# Patient Record
Sex: FEMALE | Race: BLACK OR AFRICAN AMERICAN | NOT HISPANIC OR LATINO | URBAN - METROPOLITAN AREA
[De-identification: names, ages, dates, MRNs, and addresses within clinical notes are randomized per-mention and may not be internally consistent; named-entity substitution may affect disease eponyms.]

---

## 2021-05-06 ENCOUNTER — OFFICE VISIT (OUTPATIENT)
Dept: URBAN - METROPOLITAN AREA CLINIC 118 | Facility: CLINIC | Age: 31
End: 2021-05-06
Payer: COMMERCIAL

## 2021-05-06 VITALS
TEMPERATURE: 98.4 F | WEIGHT: 175.2 LBS | HEIGHT: 67 IN | BODY MASS INDEX: 27.5 KG/M2 | DIASTOLIC BLOOD PRESSURE: 84 MMHG | HEART RATE: 79 BPM | SYSTOLIC BLOOD PRESSURE: 119 MMHG

## 2021-05-06 DIAGNOSIS — K58.1 IRRITABLE BOWEL SYNDROME WITH CONSTIPATION: ICD-10-CM

## 2021-05-06 PROCEDURE — 99243 OFF/OP CNSLTJ NEW/EST LOW 30: CPT | Performed by: INTERNAL MEDICINE

## 2021-05-06 PROCEDURE — 99213 OFFICE O/P EST LOW 20 MIN: CPT | Performed by: INTERNAL MEDICINE

## 2021-05-06 NOTE — PHYSICAL EXAM GASTROINTESTINAL
Abdomen,  soft, LLQ TTP, nondistended,  no guarding or rigidity,  no masses palpable,  normal bowel sounds,  Liver and Spleen, no hepatosplenomegaly

## 2021-05-08 LAB — T-TRANSGLUTAMINASE (TTG) IGA: <2

## 2021-06-07 ENCOUNTER — OFFICE VISIT (OUTPATIENT)
Dept: URBAN - METROPOLITAN AREA CLINIC 118 | Facility: CLINIC | Age: 31
End: 2021-06-07
Payer: COMMERCIAL

## 2021-06-07 VITALS
DIASTOLIC BLOOD PRESSURE: 83 MMHG | WEIGHT: 174.6 LBS | BODY MASS INDEX: 27.4 KG/M2 | HEIGHT: 67 IN | SYSTOLIC BLOOD PRESSURE: 125 MMHG | TEMPERATURE: 97.5 F | HEART RATE: 71 BPM

## 2021-06-07 DIAGNOSIS — K58.1 IRRITABLE BOWEL SYNDROME WITH CONSTIPATION: ICD-10-CM

## 2021-06-07 PROCEDURE — 99213 OFFICE O/P EST LOW 20 MIN: CPT | Performed by: INTERNAL MEDICINE

## 2021-06-07 RX ORDER — LINACLOTIDE 290 UG/1
1 CAPSULE AT LEAST 30 MINUTES BEFORE THE FIRST MEAL OF THE DAY ON AN EMPTY STOMACH CAPSULE, GELATIN COATED ORAL ONCE A DAY
Qty: 90 | Refills: 1 | OUTPATIENT
Start: 2021-06-07 | End: 2021-12-03

## 2021-06-07 NOTE — HPI-TODAY'S VISIT:
Pt presents for f/u of IBS. At last visit, samples of Trulance and Linzess given. She good symptom control with Linzess 290 mcg. She has been feeling much better. Requesting rx for Linzess. BMs 3-4x/week, improving with meds. Denies GI blood loss, diarrhea, N/V, fever.

## 2021-12-06 ENCOUNTER — OFFICE VISIT (OUTPATIENT)
Dept: URBAN - METROPOLITAN AREA CLINIC 118 | Facility: CLINIC | Age: 31
End: 2021-12-06
Payer: COMMERCIAL

## 2021-12-06 ENCOUNTER — LAB OUTSIDE AN ENCOUNTER (OUTPATIENT)
Dept: URBAN - METROPOLITAN AREA CLINIC 118 | Facility: CLINIC | Age: 31
End: 2021-12-06

## 2021-12-06 DIAGNOSIS — K58.1 IRRITABLE BOWEL SYNDROME WITH CONSTIPATION: ICD-10-CM

## 2021-12-06 PROCEDURE — 99214 OFFICE O/P EST MOD 30 MIN: CPT | Performed by: INTERNAL MEDICINE

## 2021-12-06 RX ORDER — LINACLOTIDE 290 UG/1
1 CAPSULE AT LEAST 30 MINUTES BEFORE THE FIRST MEAL OF THE DAY ON AN EMPTY STOMACH CAPSULE, GELATIN COATED ORAL ONCE A DAY
Qty: 90 | Refills: 2
Start: 2021-06-07 | End: 2022-09-02

## 2021-12-06 NOTE — HPI-TODAY'S VISIT:
This is a 29 yo female here for follow up in IBS-C.  Has been taking linzess 290 mcg with improvement in constipation. She continues to have a lot of straining and hard stools at times. She feels the urge for defecation but has difficulty with fully evacuating.  Complains of abdominal bloating and excessive gas.

## 2021-12-09 ENCOUNTER — DASHBOARD ENCOUNTERS (OUTPATIENT)
Age: 31
End: 2021-12-09

## 2021-12-09 PROBLEM — 440630006: Status: ACTIVE | Noted: 2021-05-06

## 2021-12-22 NOTE — PHYSICAL EXAM SKIN:
no rashes,  no suspicious lesions,  no areas of discoloration,  no jaundice present,  good turgor,  no masses,  no tenderness on palpation
home

## 2022-03-07 ENCOUNTER — OFFICE VISIT (OUTPATIENT)
Dept: URBAN - METROPOLITAN AREA CLINIC 118 | Facility: CLINIC | Age: 32
End: 2022-03-07

## 2022-09-09 ENCOUNTER — ERX REFILL RESPONSE (OUTPATIENT)
Dept: URBAN - METROPOLITAN AREA CLINIC 118 | Facility: CLINIC | Age: 32
End: 2022-09-09

## 2022-09-09 RX ORDER — LINACLOTIDE 290 UG/1
TAKE 1 CAPSULE BY MOUTH AT LEAST 30 MINUTES BEFORE FIRST MEAL OF THE DAY ON EMPTY STOMACH ONCE DAILY CAPSULE, GELATIN COATED ORAL
Qty: 90 CAPSULE | Refills: 2 | OUTPATIENT

## 2023-07-25 NOTE — HPI-TODAY'S VISIT:
30-year-old female with PMH of IBS was referred by Dr. Sally Tran for digestive issues.  A copy of this document will be sent to the provider. She was last seen in 2019 for evaluation of increasing constipation.  Linzess 290 MCG worked well to help control symptoms but with occ loose stool.  Colonoscopy 4/17/2019 normal.  Symptoms believed to be functional, due to stress. Gabriel went to [facility] ED on 4/17/2021 with normal abd xray and labs. Today, she complains of bloating, constipation. She has been taking Vitamin CKLS, Magnesium, Flaxseed with mild relief. She reports lower abd pain, relieved with BMs, incomplete evacuation. She can go several days without BMs. She notices increased bloating with wheat. She would like to r/o celiac. Sx worse with stress. Denies GI blood loss, N/V, wt loss. No FH of colon CA.
Strong peripheral pulses
Statement Selected